# Patient Record
Sex: MALE | Race: WHITE
[De-identification: names, ages, dates, MRNs, and addresses within clinical notes are randomized per-mention and may not be internally consistent; named-entity substitution may affect disease eponyms.]

---

## 2019-09-18 ENCOUNTER — HOSPITAL ENCOUNTER (OUTPATIENT)
Dept: HOSPITAL 56 - MW.ED | Age: 79
Setting detail: OBSERVATION
LOS: 1 days | Discharge: HOME | End: 2019-09-19
Attending: INTERNAL MEDICINE | Admitting: INTERNAL MEDICINE
Payer: MEDICARE

## 2019-09-18 DIAGNOSIS — I25.2: ICD-10-CM

## 2019-09-18 DIAGNOSIS — Z79.82: ICD-10-CM

## 2019-09-18 DIAGNOSIS — Z79.899: ICD-10-CM

## 2019-09-18 DIAGNOSIS — K21.9: ICD-10-CM

## 2019-09-18 DIAGNOSIS — Z88.5: ICD-10-CM

## 2019-09-18 DIAGNOSIS — R00.1: ICD-10-CM

## 2019-09-18 DIAGNOSIS — E87.1: ICD-10-CM

## 2019-09-18 DIAGNOSIS — Z91.018: ICD-10-CM

## 2019-09-18 DIAGNOSIS — Z88.8: ICD-10-CM

## 2019-09-18 DIAGNOSIS — R11.0: ICD-10-CM

## 2019-09-18 DIAGNOSIS — R61: Primary | ICD-10-CM

## 2019-09-18 LAB
BUN SERPL-MCNC: 14 MG/DL (ref 7–18)
CHLORIDE SERPL-SCNC: 100 MMOL/L (ref 98–107)
CO2 SERPL-SCNC: 27.4 MMOL/L (ref 21–32)
GLUCOSE SERPL-MCNC: 123 MG/DL (ref 74–106)
POTASSIUM SERPL-SCNC: 4.1 MMOL/L (ref 3.5–5.1)
SODIUM SERPL-SCNC: 133 MMOL/L (ref 136–148)

## 2019-09-18 PROCEDURE — G0378 HOSPITAL OBSERVATION PER HR: HCPCS

## 2019-09-18 PROCEDURE — 84484 ASSAY OF TROPONIN QUANT: CPT

## 2019-09-18 PROCEDURE — 80053 COMPREHEN METABOLIC PANEL: CPT

## 2019-09-18 PROCEDURE — 71045 X-RAY EXAM CHEST 1 VIEW: CPT

## 2019-09-18 PROCEDURE — 93005 ELECTROCARDIOGRAM TRACING: CPT

## 2019-09-18 PROCEDURE — 99285 EMERGENCY DEPT VISIT HI MDM: CPT

## 2019-09-18 PROCEDURE — 96372 THER/PROPH/DIAG INJ SC/IM: CPT

## 2019-09-18 PROCEDURE — 80048 BASIC METABOLIC PNL TOTAL CA: CPT

## 2019-09-18 PROCEDURE — 36415 COLL VENOUS BLD VENIPUNCTURE: CPT

## 2019-09-18 PROCEDURE — 85025 COMPLETE CBC W/AUTO DIFF WBC: CPT

## 2019-09-18 NOTE — PCM.HP.2
H&P History of Present Illness





- General


Date of Service: 09/18/19


Admit Problem/Dx: 


 Admission Diagnosis/Problem





Admission Diagnosis/Problem      Chest pain











- History of Present Illness


Initial Comments - Free Text/Narative: 


The patient is a 79 year old male who presents today with episode of 

diaphoresis and nausea while eating breakfast with friends.  He reports MI with 

stent 2 years ago that presented with the same symptoms.  He denies chest pain, 

shortness of breath, abdominal pain, diarrhea, or edema.  He no longer feels 

diaphoretic or nauseous.  Last appt with cardiology was in [place] in April.  

No changes were made to his management at that time.  He reports he has never 

had a stress test. He is a non smoker.  He has a positive cardiac history, 

father had first MI at age 56 and passed away from it at 72. mother passed away 

from MI at 83.





In the ER, work up showed no white count, anemia.  He was slightly hyponatremic 

with normal kidney function.  Initial trop was negative. CXR showed no acute 

cardiopulmonary process.  EKG showed normal sinus rhythm without ST elevation.  

He was given full dose aspirin in the ER. 





PCP- Dr. Buck








- Related Data


Allergies/Adverse Reactions: 


 Allergies











Allergy/AdvReac Type Severity Reaction Status Date / Time


 


codeine Allergy  Nausea Verified 09/18/19 10:58


 


wheat Allergy  Other Verified 09/18/19 10:58


 


pain medications Allergy  Confusion Uncoded 09/18/19 10:58











Home Medications: 


 Home Meds





Aspirin 81 mg PO DAILY 09/18/19 [History]


Esomeprazole Magnesium [Nexium 24Hr] 20 mg PO DAILY 09/18/19 [History]


Losartan Potassium 25 mg PO DAILY 09/18/19 [History]


atorvaSTATin [Lipitor] 40 mg PO BEDTIME 09/18/19 [History]











Past Medical History


HEENT History: Reports: Hard of Hearing


Other HEENT History: hearing aids


Cardiovascular History: Reports: MI


Other Cardiovascular History: MI with stents in 2017


Respiratory History: Reports: None


Gastrointestinal History: Reports: GERD


Genitourinary History: Reports: None


Musculoskeletal History: Reports: None


Psychiatric History: Reports: None


Endocrine/Metabolic History: Reports: None


Hematologic History: Reports: None


Immunologic History: Reports: None





- Infectious Disease History


Infectious Disease History: Reports: Chicken Pox, Measles, Mumps





- Past Surgical History


Cardiovascular Surgical History: Reports: Coronary Artery Stent


GI Surgical History: Reports: Hernia Repair/Other





Social & Family History





- Family History


Family Medical History: Noncontributory





- Tobacco Use


Smoking Status *Q: Never Smoker


Second Hand Smoke Exposure: No





- Alcohol Use


Days Per Week of Alcohol Use: 7


Number of Drinks Per Day: 2


Total Drinks Per Week: 14





- Recreational Drug Use


Recreational Drug Use: No





H&P Review of Systems





- Review of Systems:


Review Of Systems: See Below


General: Reports: Diaphoresis.  Denies: Fever, Chills


HEENT: Reports: No Symptoms


Pulmonary: Reports: No Symptoms


Cardiovascular: Reports: No Symptoms


Gastrointestinal: Reports: Nausea.  Denies: Abdominal Pain, Constipation, 

Diarrhea, Vomiting


Genitourinary: Reports: No Symptoms


Musculoskeletal: Reports: No Symptoms


Skin: Reports: No Symptoms


Psychiatric: Reports: No Symptoms


Neurological: Reports: No Symptoms


Hematologic/Lymphatic: Reports: No Symptoms


Immunologic: Reports: No Symptoms





Exam





- Exam


Exam: See Below





- Vital Signs


Vital Signs: 


 Last Vital Signs











Temp  97.1 F   09/18/19 07:35


 


Pulse  51 L  09/18/19 08:44


 


Resp  18   09/18/19 08:44


 


BP  134/61   09/18/19 08:44


 


Pulse Ox  100   09/18/19 08:44











Weight: 61.689 kg





- Exam


General: Alert, Oriented, Cooperative


HEENT: Conjunctiva Clear, EOMI, Mucosa Moist & Pink, Posterior Pharynx Clear, 

Pupils Equal, Pupils Reactive


Neck: Supple, Trachea Midline


Lungs: Clear to Auscultation, Normal Respiratory Effort


Cardiovascular: Regular Rate, Regular Rhythm


GI/Abdominal Exam: Normal Bowel Sounds, Soft, Non-Tender, No Distention


Extremities: No Pedal Edema


Skin: Warm, Dry, Intact


Psychiatric: Alert, Normal Affect, Normal Mood





- Patient Data


Lab Results Last 24 hrs: 


 Laboratory Results - last 24 hr











  09/18/19 09/18/19 Range/Units





  07:52 07:52 


 


WBC  7.98   (4.0-11.0)  K/uL


 


RBC  4.18 L   (4.50-5.90)  M/uL


 


Hgb  14.1   (13.0-17.0)  g/dL


 


Hct  41.5   (38.0-50.0)  %


 


MCV  99.3 H   (80.0-98.0)  fL


 


MCH  33.7 H   (27.0-32.0)  pg


 


MCHC  34.0   (31.0-37.0)  g/dL


 


RDW Std Deviation  46.8   (28.0-62.0)  fl


 


RDW Coeff of Nelda  13   (11.0-15.0)  %


 


Plt Count  176   (150-400)  K/uL


 


MPV  9.50   (7.40-12.00)  fL


 


Neut % (Auto)  85.8 H   (48.0-80.0)  %


 


Lymph % (Auto)  8.3 L   (16.0-40.0)  %


 


Mono % (Auto)  5.5   (0.0-15.0)  %


 


Eos % (Auto)  0.3   (0.0-7.0)  %


 


Baso % (Auto)  0.1   (0.0-1.5)  %


 


Neut # (Auto)  6.9 H   (1.4-5.7)  K/uL


 


Lymph # (Auto)  0.7   (0.6-2.4)  K/uL


 


Mono # (Auto)  0.4   (0.0-0.8)  K/uL


 


Eos # (Auto)  0.0   (0.0-0.7)  K/uL


 


Baso # (Auto)  0.0   (0.0-0.1)  K/uL


 


Nucleated RBC %  0.0   /100WBC


 


Nucleated RBCs #  0   K/uL


 


Sodium   133 L  (136-148)  mmol/L


 


Potassium   4.1  (3.5-5.1)  mmol/L


 


Chloride   100  ()  mmol/L


 


Carbon Dioxide   27.4  (21.0-32.0)  mmol/L


 


BUN   14  (7.0-18.0)  mg/dL


 


Creatinine   0.9  (0.8-1.3)  mg/dL


 


Est Cr Clr Drug Dosing   56.17  mL/min


 


Estimated GFR (MDRD)   > 60.0  ml/min


 


Glucose   123 H  ()  mg/dL


 


Calcium   10.5 H  (8.5-10.1)  mg/dL


 


Total Bilirubin   1.2 H  (0.2-1.0)  mg/dL


 


AST   25  (15-37)  IU/L


 


ALT   30  (14-63)  IU/L


 


Alkaline Phosphatase   64  ()  U/L


 


Troponin I   < 0.050  (0.000-0.056)  ng/mL


 


Total Protein   6.4  (6.4-8.2)  g/dL


 


Albumin   3.6  (3.4-5.0)  g/dL


 


Globulin   2.8  (2.6-4.0)  g/dL


 


Albumin/Globulin Ratio   1.3  (0.9-1.6)  











Result Diagrams: 


 09/18/19 07:52





 09/18/19 07:52


Problem List Initiated/Reviewed/Updated: Yes


Orders Last 24hrs: 


 Active Orders 24 hr











 Category Date Time Status


 


 Patient Status [ADT] Stat ADT  09/18/19 08:29 Ordered


 


 Cardiac Monitoring [RC] .AS DIRECTED Care  09/18/19 07:45 Active


 


 EKG Documentation Completion [RC] STAT Care  09/18/19 07:45 Active


 


 Intake and Output [RC] ASDIRECTED Care  09/18/19 08:52 Ordered


 


 Oxygen Therapy, ED [RC] ASDIRECTED Care  09/18/19 07:45 Active


 


 Telemetry Monitoring [Cardiac Monitoring] [RC] .AS Care  09/18/19 08:52 Ordered





 DIRECTED   


 


 Vital Signs [RC] PER UNIT ROUTINE Care  09/18/19 08:52 Ordered


 


 Heart Healthy Diet [DIET] Diet  09/18/19 Lunch Ordered


 


 TROPONIN I [CHEM] Timed Lab  09/18/19 14:00 Ordered


 


 TROPONIN I [CHEM] Timed Lab  09/18/19 20:00 Ordered


 


 Enoxaparin [Lovenox] Med  09/18/19 09:00 Ordered





 40 mg SUBCUT Q24H   


 


 Sodium Chloride 0.9% [Saline Flush] Med  09/18/19 07:45 Active





 10 ml FLUSH ASDIRECTED PRN   


 


 Sodium Chloride 0.9% [Saline Flush] Med  09/18/19 07:45 Active





 2.5 ml FLUSH ASDIRECTED PRN   


 


 Saline Lock Insert [OM.PC] Stat Oth  09/18/19 07:45 Ordered


 


 Resuscitation Status Stat Resus Stat  09/18/19 08:52 Ordered








 Medication Orders





Enoxaparin Sodium (Lovenox)  40 mg SUBCUT Q24H JG


Sodium Chloride (Saline Flush)  10 ml FLUSH ASDIRECTED PRN


   PRN Reason: Keep Vein Open


Sodium Chloride (Saline Flush)  2.5 ml FLUSH ASDIRECTED PRN


   PRN Reason: Keep Vein Open








Assessment/Plan Comment:: 


1. Admit for observation


2. Code status- Full


3. Vitals per routine


4. I/Os per routine


5. Diet- heart healthy


6. DVT prophylaxis with Lovenox


7. Diaphoresis and nausea in patient with hx of MI- trend troponins, monitor on 

telemetry, continue home medications


8. Hyponatremia- give 1 L of IVF, recheck in AM


9. Bradycardia- monitor on telemetry, not on any medications that would slow 

his heart. Reviewed PCP records and his HR runs 50-60.

## 2019-09-18 NOTE — CR
INDICATION:



Pain, shortness of breath.



TECHNIQUE:



Chest 1 view



COMPARISON:



None.



FINDINGS:



The extreme lung apices are obscured by artifact. Within these limitations, 

no focal consolidation, pleural effusion, or pneumothorax is seen. Normal 

heart size and pulmonary vascularity. Tortuous aorta.



IMPRESSION:



No acute cardiopulmonary findings.



Dictated by Karen Munoz MD @ Sep 18 2019  8:49AM



Signed by Dr. Karen Munoz @ Sep 18 2019  8:50AM

## 2019-09-19 LAB
BUN SERPL-MCNC: 12 MG/DL (ref 7–18)
CHLORIDE SERPL-SCNC: 104 MMOL/L (ref 98–107)
CO2 SERPL-SCNC: 26.3 MMOL/L (ref 21–32)
GLUCOSE SERPL-MCNC: 101 MG/DL (ref 74–106)
POTASSIUM SERPL-SCNC: 4.6 MMOL/L (ref 3.5–5.1)
SODIUM SERPL-SCNC: 138 MMOL/L (ref 136–148)

## 2019-09-19 NOTE — PCM.DCSUM1
<Bertha Jordan - Last Filed: 09/19/19 10:36>





**Discharge Summary





- Hospital Course


HPI Initial Comments: 


Admission Date: 9/18/19


Discharge Date: 9/19/19





Admission Diagnosis:


1.  Diaphoresis/nausea with hx of MI


2. Bradycardia


3. Hyponatremia





Discharge Diagnosis:


1.  Diaphoresis/nausea with hx of MI- resolved


2. Bradycardia- stable


3. Hyponatremia- resolved





Procedures: None





Consults: None





Hospital Course: The patient is a 79 year old male who presents today with 

episode of diaphoresis and nausea while eating breakfast with friends.  He 

reports MI with stent 2 years ago that presented with the same symptoms.  He 

denies chest pain, shortness of breath, abdominal pain, diarrhea, or edema.  He 

no longer feels diaphoretic or nauseous.  Last appt with cardiology was in 

[place] in April.  No changes were made to his management at that time.  He 

reports he has never had a stress test. In the ER, work up showed no white count

, anemia.  He was slightly hyponatremic with normal kidney function.  Initial 

trop was negative. CXR showed no acute cardiopulmonary process.  EKG showed 

normal sinus rhythm without ST elevation.  He was given full dose aspirin in 

the ER. Was admitted to medical surgical consult.  His troponin were trended 

and negative x 3 with no further episodes of his symptoms or chest pain. 

Monitored on telemetry and he remained in sinus rhythm and sinus bradycardia 

which is his normal state. He was given 1 L of fluid and his hyponatremia 

resolved.  By day of discharge patient stated he felt good and was ready to go 

home. 





Disposition: Home





Discharge Condition:  vitals stable, tolerating oral diet, ambulating without 

difficulty, symptom improvement





Discharge Instructions: heart healthy diet as tolerated, activity as tolerated, 

take medications as prescribed.  Symptoms to report to physician include fever/

chills, chest pain, shortness of breath, abdominal pain, erythema,  drainage/

discharge, or not improving as expected.





Discharge Medications:


Aspirin 81 mg PO DAILY 


Esomeprazole Magnesium [Nexium 24Hr] 20 mg PO DAILY 


Losartan Potassium 25 mg PO DAILY 


atorvaSTATin [Lipitor] 40 mg PO BEDTIME 





Follow-up:


1. PCP- Dr. Buck


2. Cardiology- Dr. LEPE


3. Outpatient stress test

















- Discharge Data


Discharge Date: 09/19/19


Discharge Disposition: Home, Self-Care 01


Condition: Stable





- Referral to Home Health


Primary Care Physician: 


PCP None








- Discharge Plan


Home Medications: 


 Home Meds





Aspirin 81 mg PO DAILY 09/18/19 [History]


Esomeprazole Magnesium [Nexium 24Hr] 20 mg PO DAILY 09/18/19 [History]


Losartan Potassium 25 mg PO DAILY 09/18/19 [History]


atorvaSTATin [Lipitor] 40 mg PO BEDTIME 09/18/19 [History]








Patient Handouts:  Acute Coronary Syndrome


Referrals: 


Alex Hill MD [Physician] - 10/23/19 1:00 pm


Edi Buck MD [Physician] - 10/04/19 11:15 am





- Discharge Summary/Plan Comment


DC Time >30 min.: No





- Patient Data


Vitals - Most Recent: 


 Last Vital Signs











Temp  98.2 F   09/19/19 07:42


 


Pulse  52 L  09/19/19 07:42


 


Resp  16   09/19/19 07:42


 


BP  127/60   09/19/19 07:42


 


Pulse Ox  97   09/19/19 07:42











Weight - Most Recent: 61.689 kg


I&O - Last 24 hours: 


 Intake & Output











 09/18/19 09/19/19 09/19/19





 22:59 06:59 14:59


 


Intake Total 1350 1090 


 


Output Total 850 1250 


 


Balance 500 -160 











Lab Results - Last 24 hrs: 


 Laboratory Results - last 24 hr











  09/18/19 09/18/19 09/18/19 Range/Units





  07:52 07:52 14:00 


 


WBC  7.98    (4.0-11.0)  K/uL


 


RBC  4.18 L    (4.50-5.90)  M/uL


 


Hgb  14.1    (13.0-17.0)  g/dL


 


Hct  41.5    (38.0-50.0)  %


 


MCV  99.3 H    (80.0-98.0)  fL


 


MCH  33.7 H    (27.0-32.0)  pg


 


MCHC  34.0    (31.0-37.0)  g/dL


 


RDW Std Deviation  46.8    (28.0-62.0)  fl


 


RDW Coeff of Nelda  13    (11.0-15.0)  %


 


Plt Count  176    (150-400)  K/uL


 


MPV  9.50    (7.40-12.00)  fL


 


Neut % (Auto)  85.8 H    (48.0-80.0)  %


 


Lymph % (Auto)  8.3 L    (16.0-40.0)  %


 


Mono % (Auto)  5.5    (0.0-15.0)  %


 


Eos % (Auto)  0.3    (0.0-7.0)  %


 


Baso % (Auto)  0.1    (0.0-1.5)  %


 


Neut # (Auto)  6.9 H    (1.4-5.7)  K/uL


 


Lymph # (Auto)  0.7    (0.6-2.4)  K/uL


 


Mono # (Auto)  0.4    (0.0-0.8)  K/uL


 


Eos # (Auto)  0.0    (0.0-0.7)  K/uL


 


Baso # (Auto)  0.0    (0.0-0.1)  K/uL


 


Nucleated RBC %  0.0    /100WBC


 


Nucleated RBCs #  0    K/uL


 


Sodium   133 L   (136-148)  mmol/L


 


Potassium   4.1   (3.5-5.1)  mmol/L


 


Chloride   100   ()  mmol/L


 


Carbon Dioxide   27.4   (21.0-32.0)  mmol/L


 


BUN   14   (7.0-18.0)  mg/dL


 


Creatinine   0.9   (0.8-1.3)  mg/dL


 


Est Cr Clr Drug Dosing   56.17   mL/min


 


Estimated GFR (MDRD)   > 60.0   ml/min


 


Glucose   123 H   ()  mg/dL


 


Calcium   10.5 H   (8.5-10.1)  mg/dL


 


Total Bilirubin   1.2 H   (0.2-1.0)  mg/dL


 


AST   25   (15-37)  IU/L


 


ALT   30   (14-63)  IU/L


 


Alkaline Phosphatase   64   ()  U/L


 


Troponin I   < 0.050  < 0.050  (0.000-0.056)  ng/mL


 


Total Protein   6.4   (6.4-8.2)  g/dL


 


Albumin   3.6   (3.4-5.0)  g/dL


 


Globulin   2.8   (2.6-4.0)  g/dL


 


Albumin/Globulin Ratio   1.3   (0.9-1.6)  














  09/18/19 09/19/19 09/19/19 Range/Units





  20:09 05:58 05:58 


 


WBC   6.00   (4.0-11.0)  K/uL


 


RBC   4.10 L   (4.50-5.90)  M/uL


 


Hgb   13.4   (13.0-17.0)  g/dL


 


Hct   41.0   (38.0-50.0)  %


 


MCV   100.0 H   (80.0-98.0)  fL


 


MCH   32.7 H   (27.0-32.0)  pg


 


MCHC   32.7   (31.0-37.0)  g/dL


 


RDW Std Deviation   47.4   (28.0-62.0)  fl


 


RDW Coeff of Nelda   13   (11.0-15.0)  %


 


Plt Count   154   (150-400)  K/uL


 


MPV   9.40   (7.40-12.00)  fL


 


Neut % (Auto)   67.8   (48.0-80.0)  %


 


Lymph % (Auto)   21.2   (16.0-40.0)  %


 


Mono % (Auto)   9.7   (0.0-15.0)  %


 


Eos % (Auto)   1.0   (0.0-7.0)  %


 


Baso % (Auto)   0.3   (0.0-1.5)  %


 


Neut # (Auto)   4.1   (1.4-5.7)  K/uL


 


Lymph # (Auto)   1.3   (0.6-2.4)  K/uL


 


Mono # (Auto)   0.6   (0.0-0.8)  K/uL


 


Eos # (Auto)   0.1   (0.0-0.7)  K/uL


 


Baso # (Auto)   0.0   (0.0-0.1)  K/uL


 


Nucleated RBC %   0.0   /100WBC


 


Nucleated RBCs #   0   K/uL


 


Sodium    138  (136-148)  mmol/L


 


Potassium    4.6  (3.5-5.1)  mmol/L


 


Chloride    104  ()  mmol/L


 


Carbon Dioxide    26.3  (21.0-32.0)  mmol/L


 


BUN    12  (7.0-18.0)  mg/dL


 


Creatinine    1.0  (0.8-1.3)  mg/dL


 


Est Cr Clr Drug Dosing    50.55  mL/min


 


Estimated GFR (MDRD)    > 60.0  ml/min


 


Glucose    101  ()  mg/dL


 


Calcium    9.1  (8.5-10.1)  mg/dL


 


Total Bilirubin     (0.2-1.0)  mg/dL


 


AST     (15-37)  IU/L


 


ALT     (14-63)  IU/L


 


Alkaline Phosphatase     ()  U/L


 


Troponin I  < 0.050    (0.000-0.056)  ng/mL


 


Total Protein     (6.4-8.2)  g/dL


 


Albumin     (3.4-5.0)  g/dL


 


Globulin     (2.6-4.0)  g/dL


 


Albumin/Globulin Ratio     (0.9-1.6)  











Med Orders - Current: 


 Current Medications





Acetaminophen (Tylenol)  650 mg PO Q4H PRN


   PRN Reason: Pain/Fever


Aspirin (Aspirin)  81 mg PO DAILY [facility]


Atorvastatin Calcium (Lipitor)  40 mg PO BEDTIME [facility]


   Last Admin: 09/18/19 20:32 Dose:  40 mg


Enoxaparin Sodium (Lovenox)  40 mg SUBCUT Q24H [facility]


   Last Admin: 09/18/19 11:14 Dose:  40 mg


Losartan Potassium (Cozaar)  25 mg PO DAILY [facility]


Ondansetron HCl (Zofran)  4 mg IVPUSH Q4H PRN


   PRN Reason: Nausea/Vomiting


Esomeprazole 20mg  1 each PO DAILY [facility]


   Last Admin: 09/18/19 17:39 Dose:  1 each


Sodium Chloride (Saline Flush)  10 ml FLUSH ASDIRECTED PRN


   PRN Reason: Keep Vein Open


Sodium Chloride (Saline Flush)  2.5 ml FLUSH ASDIRECTED PRN


   PRN Reason: Keep Vein Open





Discontinued Medications





Aspirin (Aspirin)  324 mg PO ONETIME ONE


   Stop: 09/18/19 07:50


   Last Admin: 09/18/19 07:57 Dose:  Not Given


Enoxaparin Sodium (Lovenox)  40 mg SUBCUT Q24H [facility]


   Last Admin: 09/18/19 11:05 Dose:  Not Given


Sodium Chloride (Normal Saline)  1,000 mls @ 125 mls/hr IV ASDIRECTED [facility]


   Stop: 09/18/19 17:14


   Last Admin: 09/18/19 11:13 Dose:  125 mls/hr


Omeprazole (Omeprazole)  20 mg PO DAILY [facility]











<Nicanor Garza - Last Filed: 09/20/19 18:56>





**Discharge Summary





- Referral to Home Health


Primary Care Physician: 


PCP None








- Patient Data


Vitals - Most Recent: 


 Last Vital Signs











Temp  36.5 C   09/19/19 12:00


 


Pulse  78   09/19/19 12:00


 


Resp  16   09/19/19 12:00


 


BP  126/66   09/19/19 12:00


 


Pulse Ox  96   09/19/19 12:00











Med Orders - Current: 


 Current Medications








Discontinued Medications





Acetaminophen (Tylenol)  650 mg PO Q4H PRN


   PRN Reason: Pain/Fever


Aspirin (Aspirin)  324 mg PO ONETIME ONE


   Stop: 09/18/19 07:50


   Last Admin: 09/18/19 07:57 Dose:  Not Given


Aspirin (Aspirin)  81 mg PO DAILY [facility]


   Last Admin: 09/19/19 09:30 Dose:  81 mg


Atorvastatin Calcium (Lipitor)  40 mg PO BEDTIME [facility]


   Last Admin: 09/18/19 20:32 Dose:  40 mg


Enoxaparin Sodium (Lovenox)  40 mg SUBCUT Q24H [facility]


   Last Admin: 09/18/19 11:05 Dose:  Not Given


Enoxaparin Sodium (Lovenox)  40 mg SUBCUT Q24H [facility]


   Last Admin: 09/19/19 12:23 Dose:  Not Given


Sodium Chloride (Normal Saline)  1,000 mls @ 125 mls/hr IV ASDIRECTED [facility]


   Stop: 09/18/19 17:14


   Last Admin: 09/18/19 11:13 Dose:  125 mls/hr


Losartan Potassium (Cozaar)  25 mg PO DAILY [facility]


Omeprazole (Omeprazole)  20 mg PO DAILY [facility]


Ondansetron HCl (Zofran)  4 mg IVPUSH Q4H PRN


   PRN Reason: Nausea/Vomiting


Esomeprazole 20mg  1 each PO DAILY [facility]


   Last Admin: 09/19/19 09:30 Dose:  1 each


Sodium Chloride (Saline Flush)  10 ml FLUSH ASDIRECTED PRN


   PRN Reason: Keep Vein Open


Sodium Chloride (Saline Flush)  2.5 ml FLUSH ASDIRECTED PRN


   PRN Reason: Keep Vein Open











- Free Text/Narrative


Note: 





I have seen and evaluated the patient with the resident.  I have discussed 

findings and treatment plan with the resident.  I agree with the assessment and 

plan outlined in the following note.